# Patient Record
Sex: FEMALE | Race: WHITE | Employment: UNEMPLOYED | URBAN - METROPOLITAN AREA
[De-identification: names, ages, dates, MRNs, and addresses within clinical notes are randomized per-mention and may not be internally consistent; named-entity substitution may affect disease eponyms.]

---

## 2017-12-12 ENCOUNTER — HOSPITAL ENCOUNTER (EMERGENCY)
Age: 62
Discharge: HOME | End: 2017-12-12
Attending: EMERGENCY MEDICINE
Payer: MEDICARE

## 2017-12-12 VITALS
OXYGEN SATURATION: 95 % | HEIGHT: 66 IN | HEART RATE: 84 BPM | DIASTOLIC BLOOD PRESSURE: 84 MMHG | BODY MASS INDEX: 28.28 KG/M2 | SYSTOLIC BLOOD PRESSURE: 130 MMHG | TEMPERATURE: 98 F | RESPIRATION RATE: 16 BRPM | WEIGHT: 176 LBS

## 2017-12-12 DIAGNOSIS — G89.29 CHRONIC RIGHT-SIDED LOW BACK PAIN WITHOUT SCIATICA: Primary | ICD-10-CM

## 2017-12-12 DIAGNOSIS — M54.50 CHRONIC RIGHT-SIDED LOW BACK PAIN WITHOUT SCIATICA: Primary | ICD-10-CM

## 2017-12-12 LAB
BACTERIA URNS QL MICRO: 0 /HPF
CASTS URNS QL MICRO: NORMAL /LPF
EPI CELLS #/AREA URNS HPF: NORMAL /HPF
RBC #/AREA URNS HPF: NORMAL /HPF
WBC URNS QL MICRO: NORMAL /HPF

## 2017-12-12 PROCEDURE — 81003 URINALYSIS AUTO W/O SCOPE: CPT | Performed by: NURSE PRACTITIONER

## 2017-12-12 PROCEDURE — 99283 EMERGENCY DEPT VISIT LOW MDM: CPT | Performed by: NURSE PRACTITIONER

## 2017-12-12 PROCEDURE — 81015 MICROSCOPIC EXAM OF URINE: CPT | Performed by: NURSE PRACTITIONER

## 2017-12-12 RX ORDER — KETOROLAC TROMETHAMINE 30 MG/ML
30 INJECTION, SOLUTION INTRAMUSCULAR; INTRAVENOUS
Status: DISCONTINUED | OUTPATIENT
Start: 2017-12-12 | End: 2017-12-12 | Stop reason: HOSPADM

## 2017-12-12 NOTE — DISCHARGE INSTRUCTIONS
Learning About Relief for Back Pain  What is back tension and strain? Back strain happens when you overstretch, or pull, a muscle in your back. You may hurt your back in an accident or when you exercise or lift something. Most back pain will get better with rest and time. You can take care of yourself at home to help your back heal.  What can you do first to relieve back pain? When you first feel back pain, try these steps:  · Walk. Take a short walk (10 to 20 minutes) on a level surface (no slopes, hills, or stairs) every 2 to 3 hours. Walk only distances you can manage without pain, especially leg pain. · Relax. Find a comfortable position for rest. Some people are comfortable on the floor or a medium-firm bed with a small pillow under their head and another under their knees. Some people prefer to lie on their side with a pillow between their knees. Don't stay in one position for too long. · Try heat or ice. Try using a heating pad on a low or medium setting, or take a warm shower, for 15 to 20 minutes every 2 to 3 hours. Or you can buy single-use heat wraps that last up to 8 hours. You can also try an ice pack for 10 to 15 minutes every 2 to 3 hours. You can use an ice pack or a bag of frozen vegetables wrapped in a thin towel. There is not strong evidence that either heat or ice will help, but you can try them to see if they help. You may also want to try switching between heat and cold. · Take pain medicine exactly as directed. ¨ If the doctor gave you a prescription medicine for pain, take it as prescribed. ¨ If you are not taking a prescription pain medicine, ask your doctor if you can take an over-the-counter medicine. What else can you do? · Stretch and exercise. Exercises that increase flexibility may relieve your pain and make it easier for your muscles to keep your spine in a good, neutral position. And don't forget to keep walking. · Do self-massage.  You can use self-massage to unwind after work or school or to energize yourself in the morning. You can easily massage your feet, hands, or neck. Self-massage works best if you are in comfortable clothes and are sitting or lying in a comfortable position. Use oil or lotion to massage bare skin. · Reduce stress. Back pain can lead to a vicious [place]: Distress about the pain tenses the muscles in your back, which in turn causes more pain. Learn how to relax your mind and your muscles to lower your stress. Where can you learn more? Go to http://can-marline.info/. Enter R123 in the search box to learn more about \"Learning About Relief for Back Pain. \"  Current as of: March 21, 2017  Content Version: 11.4  © 1054-7686 [employer]. Care instructions adapted under license by [employer] (which disclaims liability or warranty for this information). If you have questions about a medical condition or this instruction, always ask your healthcare professional. [place] [address] any warranty or liability for your use of this information.

## 2017-12-12 NOTE — ED PROVIDER NOTES
HPI Comments: Patient presents with right sided flank pain that started 3 days ago. She states she also started having urinary frequency 3 days ago. She states pain is different than her chronic low back pain. She denies new injury. Patient is a 65 y.o. female presenting with back pain. The history is provided by the patient. Back Pain    This is a new problem. The current episode started more than 2 days ago. The problem has not changed since onset. The problem occurs constantly. Patient reports not work related injury. The pain is associated with no known injury. The pain is present in the right side. The quality of the pain is described as burning. The pain does not radiate. The pain is at a severity of 10/10. The pain is mild. Associated symptoms include dysuria. Pertinent negatives include no chest pain, no fever, no numbness, no weight loss, no headaches, no abdominal pain, no abdominal swelling, no bowel incontinence, no perianal numbness, no bladder incontinence, no pelvic pain, no leg pain, no paresthesias, no paresis, no tingling and no weakness. She has tried nothing for the symptoms. Past Medical History:   Diagnosis Date    Arthritis     neck and hands    Cancer ([facility] [address] 2000    bilat breast cancer--- surg - chemo    GERD (gastroesophageal reflux disease)     controlled with med    Psychiatric disorder     aniexty, depression, bipolar    Thromboembolus ([address] 2012    right leg --    Thyroid disease     hypo       Past Surgical History:   Procedure Laterality Date    HX HYSTERECTOMY  age 40    HX LAP CHOLECYSTECTOMY  3/2013 at [address] HX MASTECTOMY  2000    bilat         Family History:   Problem Relation Age of Onset    Cancer Mother     Cancer Father     Diabetes Father     Cancer Sister        Social History     Social History    Marital status:      Spouse name: N/A    Number of children: N/A    Years of education: N/A     Occupational History    Not on file. Social History Main Topics    Smoking status: Current Every Day Smoker     Packs/day: 1.50     Years: 45.00    Smokeless tobacco: Never Used    Alcohol use No    Drug use: No    Sexual activity: Not on file     Other Topics Concern    Not on file     Social History Narrative         ALLERGIES: Lithium    Review of Systems   Constitutional: Negative for chills, fever and weight loss. Respiratory: Negative for cough and shortness of breath. Cardiovascular: Negative for chest pain. Gastrointestinal: Negative for abdominal pain, bowel incontinence, constipation, diarrhea, nausea and vomiting. Genitourinary: Positive for dysuria and flank pain. Negative for bladder incontinence and pelvic pain. Musculoskeletal: Positive for back pain. Neurological: Negative for dizziness, tingling, weakness, numbness, headaches and paresthesias. Vitals:    12/12/17 1402   BP: 130/84   Pulse: 84   Resp: 16   Temp: 98 °F (36.7 °C)   SpO2: 95%   Weight: 79.8 kg (176 lb)   Height: 5' 6\" (1.676 m)            Physical Exam   Constitutional: She appears well-developed and well-nourished. No distress. Cardiovascular: Normal rate and regular rhythm. No murmur heard. Pulmonary/Chest: Effort normal and breath sounds normal. No respiratory distress. She has no wheezes. Abdominal: Soft. There is no tenderness. Musculoskeletal:        Lumbar back: She exhibits tenderness and pain. Back:    Skin: Skin is warm and dry. She is not diaphoretic. Psychiatric: She has a normal mood and affect. Her behavior is normal.   Nursing note and vitals reviewed.      Recent Results (from the past 12 hour(s))   URINE MICROSCOPIC    Collection Time: 12/12/17  2:18 PM   Result Value Ref Range    WBC 3-5 0 /hpf    RBC 0-3 0 /hpf    Epithelial cells 3-5 0 /hpf    Bacteria 0 0 /hpf    Casts 0-3 0 /lpf       MDM  Number of Diagnoses or Management Options  Chronic right-sided low back pain without sciatica:   Diagnosis management comments: UA negative for infection. Patient given IM toradol for pain. Patient states she is suppose to see pain management in the morning for chronic back pain. Encouraged her to keep appointment to discuss increase in pain.         Amount and/or Complexity of Data Reviewed  Clinical lab tests: ordered and reviewed  Tests in the medicine section of CPT®: ordered    Patient Progress  Patient progress: stable    ED Course       Procedures